# Patient Record
Sex: FEMALE | Race: WHITE | Employment: OTHER | URBAN - METROPOLITAN AREA
[De-identification: names, ages, dates, MRNs, and addresses within clinical notes are randomized per-mention and may not be internally consistent; named-entity substitution may affect disease eponyms.]

---

## 2017-08-23 ENCOUNTER — DOCTOR'S OFFICE (OUTPATIENT)
Dept: URBAN - METROPOLITAN AREA CLINIC 125 | Facility: CLINIC | Age: 57
Setting detail: OPHTHALMOLOGY
End: 2017-08-23
Payer: COMMERCIAL

## 2017-08-23 DIAGNOSIS — H25.13: ICD-10-CM

## 2017-08-23 DIAGNOSIS — H52.4: ICD-10-CM

## 2017-08-23 DIAGNOSIS — H43.813: ICD-10-CM

## 2017-08-23 PROCEDURE — 92012 INTRM OPH EXAM EST PATIENT: CPT | Performed by: OPTOMETRIST

## 2017-08-23 PROCEDURE — 92015 DETERMINE REFRACTIVE STATE: CPT | Performed by: OPTOMETRIST

## 2017-08-23 ASSESSMENT — REFRACTION_OUTSIDERX
OS_CYLINDER: -0.25
OD_AXIS: 170
OD_CYLINDER: -0.25
OS_AXIS: 150
OD_VA3: 20/
OS_VA3: 20/
OS_AXIS: 175
OD_VA3: 20/
OS_VA1: 20/20
OU_VA: 20/
OS_CYLINDER: -0.25
OD_ADD: +2.25
OD_AXIS: 030
OS_VA1: 20/
OD_CYLINDER: -0.25
OS_VA2: OU, 12-16''
OD_VA2: 20/20
OS_VA3: 20/
OS_SPHERE: +2.50
OU_VA: 20/
OD_VA2: 20/
OD_VA1: 20/25
OD_VA1: 20/
OD_SPHERE: +2.50
OD_SPHERE: +0.75
OS_ADD: +2.25
OS_SPHERE: +0.75
OS_VA2: 20/

## 2017-08-23 ASSESSMENT — REFRACTION_CURRENTRX
OS_CYLINDER: -0.25
OD_OVR_VA: 20/
OD_OVR_VA: 20/
OS_AXIS: 146
OS_OVR_VA: 20/
OD_VPRISM_DIRECTION: SV
OS_OVR_VA: 20/
OS_SPHERE: +2.50
OD_SPHERE: +2.50
OS_VPRISM_DIRECTION: SV
OS_OVR_VA: 20/
OD_AXIS: 028
OD_CYLINDER: -0.25
OD_OVR_VA: 20/

## 2017-08-23 ASSESSMENT — KERATOMETRY
OS_K2POWER_DIOPTERS: 45.75
OD_K1POWER_DIOPTERS: 44.75
OD_K2POWER_DIOPTERS: 45.75
OS_AXISANGLE_DEGREES: 173
OS_K1POWER_DIOPTERS: 44.50
OD_AXISANGLE_DEGREES: 010

## 2017-08-23 ASSESSMENT — REFRACTION_AUTOREFRACTION
OS_AXIS: 005
OS_SPHERE: +0.75
OD_AXIS: 169
OD_CYLINDER: -1.00
OS_CYLINDER: -0.50
OD_SPHERE: -1.00

## 2017-08-23 ASSESSMENT — VISUAL ACUITY
OD_BCVA: 20/40+1
OS_BCVA: 20/30

## 2017-08-23 ASSESSMENT — REFRACTION_MANIFEST
OD_VA3: 20/
OS_VA1: 20/
OU_VA: 20/
OS_VA3: 20/
OS_VA2: 20/
OD_VA1: 20/
OD_VA2: 20/

## 2017-08-23 ASSESSMENT — SPHEQUIV_DERIVED
OD_SPHEQUIV: -1.5
OS_SPHEQUIV: 0.5

## 2017-08-23 ASSESSMENT — AXIALLENGTH_DERIVED
OD_AL: 23.5337
OS_AL: 22.8259

## 2017-09-22 ENCOUNTER — OPTICAL OFFICE (OUTPATIENT)
Dept: URBAN - METROPOLITAN AREA CLINIC 134 | Facility: CLINIC | Age: 57
Setting detail: OPHTHALMOLOGY
End: 2017-09-22
Payer: COMMERCIAL

## 2017-09-22 DIAGNOSIS — H52.223: ICD-10-CM

## 2017-09-22 PROCEDURE — V2781 PROGRESSIVE LENS PER LENS: HCPCS | Performed by: OPTOMETRIST

## 2017-09-22 PROCEDURE — V2203 LENS SPHCYL BIFOCAL 4.00D/.1: HCPCS | Performed by: OPTOMETRIST

## 2017-09-22 PROCEDURE — V2020 VISION SVCS FRAMES PURCHASES: HCPCS | Performed by: OPTOMETRIST

## 2018-02-16 ENCOUNTER — OPTICAL OFFICE (OUTPATIENT)
Dept: URBAN - METROPOLITAN AREA CLINIC 134 | Facility: CLINIC | Age: 58
Setting detail: OPHTHALMOLOGY
End: 2018-02-16

## 2018-02-16 DIAGNOSIS — H52.7: ICD-10-CM

## 2018-02-16 PROCEDURE — V2020 VISION SVCS FRAMES PURCHASES: HCPCS | Performed by: OPTOMETRIST

## 2018-10-29 ENCOUNTER — APPOINTMENT (OUTPATIENT)
Dept: RADIOLOGY | Facility: CLINIC | Age: 58
End: 2018-10-29
Payer: COMMERCIAL

## 2018-10-29 ENCOUNTER — OFFICE VISIT (OUTPATIENT)
Dept: URGENT CARE | Facility: CLINIC | Age: 58
End: 2018-10-29
Payer: COMMERCIAL

## 2018-10-29 VITALS
RESPIRATION RATE: 20 BRPM | HEART RATE: 79 BPM | TEMPERATURE: 97.5 F | SYSTOLIC BLOOD PRESSURE: 164 MMHG | WEIGHT: 170 LBS | OXYGEN SATURATION: 99 % | BODY MASS INDEX: 30.12 KG/M2 | HEIGHT: 63 IN | DIASTOLIC BLOOD PRESSURE: 90 MMHG

## 2018-10-29 DIAGNOSIS — S99.922A INJURY OF LEFT FOOT, INITIAL ENCOUNTER: ICD-10-CM

## 2018-10-29 DIAGNOSIS — S99.922A INJURY OF LEFT FOOT, INITIAL ENCOUNTER: Primary | ICD-10-CM

## 2018-10-29 PROCEDURE — 73630 X-RAY EXAM OF FOOT: CPT

## 2018-10-29 PROCEDURE — 99283 EMERGENCY DEPT VISIT LOW MDM: CPT | Performed by: EMERGENCY MEDICINE

## 2018-10-29 PROCEDURE — G0382 LEV 3 HOSP TYPE B ED VISIT: HCPCS | Performed by: EMERGENCY MEDICINE

## 2018-10-29 RX ORDER — THIAMINE MONONITRATE (VIT B1) 100 MG
100 TABLET ORAL DAILY
COMMUNITY

## 2018-10-29 RX ORDER — LEVOTHYROXINE SODIUM 0.03 MG/1
100 TABLET ORAL DAILY
COMMUNITY

## 2018-10-29 RX ORDER — MULTIVITAMIN
1 CAPSULE ORAL DAILY
COMMUNITY

## 2018-10-29 RX ORDER — FOLIC ACID 1 MG/1
TABLET ORAL DAILY
COMMUNITY

## 2018-10-29 RX ORDER — SIMVASTATIN 20 MG
20 TABLET ORAL
COMMUNITY

## 2018-10-29 RX ORDER — CITALOPRAM 10 MG/1
10 TABLET ORAL DAILY
COMMUNITY

## 2018-10-29 NOTE — PROGRESS NOTES
[facility]        NAME: Mechelle Monroe is a 60 y o  female  : 1960    MRN: [id number]  DATE: 2018  TIME: 2:07 PM    Assessment and Plan   Injury of left foot, initial encounter [S99 922A]  1  Injury of left foot, initial encounter  XR foot 3+ vw left         Patient Instructions     There are no Patient Instructions on file for this visit  Follow up with PCP in 3-5 days  Proceed to  ER if symptoms worsen  Chief Complaint     Chief Complaint   Patient presents with    Foot Injury     Closet door fell on top of feet 2 weeks ago  Complaining of continuous pain since injury  History of Present Illness       Patient complains of pain dorsal feet bilaterally since a small door fell on them 2 weeks ago  The right foot seems to be healing without any problem however she continues to have pain on the left  She had prior ORIF surgery on her left foot and still has hardware in place  Review of Systems   Review of Systems   Constitutional: Negative for activity change  Gastrointestinal: Negative for abdominal pain  Musculoskeletal: Positive for gait problem  Negative for arthralgias, back pain, joint swelling, myalgias, neck pain and neck stiffness  Skin: Negative for color change and wound  Neurological: Negative for dizziness, syncope, weakness, light-headedness and headaches           Current Medications       Current Outpatient Prescriptions:     Cholecalciferol 2000 units TABS, Take 2,000 Units by mouth, Disp: , Rfl:     citalopram (CeleXA) 10 mg tablet, Take 10 mg by mouth daily, Disp: , Rfl:     cyanocobalamin 500 MCG tablet, Take 1,000 mcg by mouth daily, Disp: , Rfl:     folic acid (FOLVITE) 1 mg tablet, Take by mouth daily, Disp: , Rfl:     levothyroxine 25 mcg tablet, Take 100 mcg by mouth daily  , Disp: , Rfl:     magnesium oxide (MAG-OX) 400 mg, Take 400 mg by mouth 2 (two) times a day, Disp: , Rfl:     Multiple Vitamin (MULTIVITAMIN) capsule, Take 1 capsule by mouth daily, Disp: , Rfl:     simvastatin (ZOCOR) 20 mg tablet, Take 20 mg by mouth daily at bedtime, Disp: , Rfl:     Teriparatide, Recombinant, (FORTEO SC), Inject 20 mcg under the skin, Disp: , Rfl:     thiamine (VITAMIN B1) 100 mg tablet, Take 100 mg by mouth daily, Disp: , Rfl:     Current Allergies     Allergies as of 10/29/2018    (No Known Allergies)            The following portions of the patient's history were reviewed and updated as appropriate: allergies, current medications, past family history, past medical history, past social history, past surgical history and problem list      Past Medical History:   Diagnosis Date    Arthritis     Disease of thyroid gland     High cholesterol     Osteoporosis        Past Surgical History:   Procedure Laterality Date    ARTHROSCOPY KNEE      BREAST BIOPSY      CHOLECYSTECTOMY      ECTOPIC PREGNANCY SURGERY      EXPLORATORY LAPAROTOMY      FOOT SURGERY      HIP SURGERY      HYSTERECTOMY      SHOULDER ARTHROPLASTY      TOE SURGERY      TONSILLECTOMY         No family history on file  Medications have been verified  Objective   /90   Pulse 79   Temp 97 5 °F (36 4 °C) (Tympanic)   Resp 20   Ht 5' 3" (1 6 m)   Wt 77 1 kg (170 lb)   SpO2 99%   BMI 30 11 kg/m²        Physical Exam     Physical Exam   Constitutional: She is oriented to person, place, and time  She appears well-developed and well-nourished  HENT:   Head: Normocephalic and atraumatic  Eyes: Pupils are equal, round, and reactive to light  Conjunctivae and EOM are normal    Neck: Normal range of motion  Neck supple  Cardiovascular: Normal rate and regular rhythm  Pulmonary/Chest: Effort normal and breath sounds normal    Musculoskeletal: She exhibits tenderness  Tender left foot dorsum no swelling  Neurological: She is alert and oriented to person, place, and time  Skin: Skin is warm and dry  No rash noted     Nursing note and vitals reviewed

## 2018-10-29 NOTE — PATIENT INSTRUCTIONS
Foot Contusion   WHAT YOU NEED TO KNOW:   A foot contusion is a bruise to the foot  DISCHARGE INSTRUCTIONS:   Medicines:   · NSAIDs:  These medicines decrease swelling and pain  NSAIDs are available without a doctor's order  Ask your healthcare provider which medicine is right for you  Ask how much to take and when to take it  Take as directed  NSAIDs can cause stomach bleeding and kidney problems if not taken correctly  · Take your medicine as directed  Contact your healthcare provider if you think your medicine is not helping or if you have side effects  Tell him of her if you are allergic to any medicine  Keep a list of the medicines, vitamins, and herbs you take  Include the amounts, and when and why you take them  Bring the list or the pill bottles to follow-up visits  Carry your medicine list with you in case of an emergency  Follow up with your healthcare provider as directed:  Write down your questions so you remember to ask them during your visits  Care for your foot: Follow your treatment plan to help decrease your pain and improve your muscle movement  · Rest:  You will need to rest your foot for 1 to 2 days after your injury  This will help decrease the risk of more damage  · Ice:  Ice helps decrease swelling and pain  Ice may also help prevent tissue damage  Use an ice pack, or put crushed ice in a plastic bag  Cover it with a towel and place it on your foot for 15 to 20 minutes every hour or as directed  · Compression:  Compression (tight hold) provides support and helps decrease swelling and movement so your foot can heal  You may be told to keep your foot wrapped with a tight elastic bandage  Follow instructions about how to apply your bandage  Do not massage your foot  You could cause more damage or pain  · Elevation:  Keep your foot raised above the level of your heart while you are sitting or lying down  This will help decrease or limit swelling   Use pillows, blankets, or rolled towels to elevate your foot comfortably  Exercise your foot:  You may be given gentle exercises to improve your foot movement and help decrease stiffness  Ask when you can return to your normal activities or sports  Prevent another injury:   · Wear equipment to protect yourself when you play sports  · Make sure your shoes fit properly  · Always wear shoes on streets or sidewalks  · Clean spills off the floor right away to avoid slipping or hitting your foot  · Make sure your home is well lit when you get up during the night  This will help you avoid hurting your foot in the dark  Contact your healthcare provider if:   · You have increased swelling on your foot  · You have severe foot pain  · You are not able to move your foot  · You have questions or concerns about your injury or treatment  © 2017 [employer] Information is for End User's use only and may not be sold, redistributed or otherwise used for commercial purposes  All illustrations and images included in CareNotes® are the copyrighted property of [employer]  or [employer]  The above information is an  only  It is not intended as medical advice for individual conditions or treatments  Talk to your doctor, nurse or pharmacist before following any medical regimen to see if it is safe and effective for you

## 2019-02-18 ENCOUNTER — DOCTOR'S OFFICE (OUTPATIENT)
Dept: URBAN - METROPOLITAN AREA CLINIC 125 | Facility: CLINIC | Age: 59
Setting detail: OPHTHALMOLOGY
End: 2019-02-18
Payer: COMMERCIAL

## 2019-02-18 DIAGNOSIS — H52.4: ICD-10-CM

## 2019-02-18 PROCEDURE — 92015 DETERMINE REFRACTIVE STATE: CPT | Performed by: OPTOMETRIST

## 2019-02-18 PROCEDURE — 92014 COMPRE OPH EXAM EST PT 1/>: CPT | Performed by: OPTOMETRIST

## 2019-02-18 ASSESSMENT — REFRACTION_MANIFEST
OD_SPHERE: +0.50
OS_VA3: 20/
OS_AXIS: 175
OD_VA2: 20/
OS_VA1: 20/
OS_VA3: 20/
OD_VA3: 20/
OD_ADD: +2.25
OD_VA1: 20/25+
OD_VA1: 20/
OS_VA2: 20/
OS_ADD: +2.25
OU_VA: 20/
OD_AXIS: 170
OS_VA2: OU, 12-16''
OD_VA3: 20/
OS_SPHERE: +0.50
OD_VA2: 20/20
OS_VA1: 20/25+
OU_VA: 20/20
OD_CYLINDER: -0.25
OS_CYLINDER: -0.25

## 2019-02-18 ASSESSMENT — KERATOMETRY
OD_K1POWER_DIOPTERS: 45.00
OS_AXISANGLE_DEGREES: 168
OS_K1POWER_DIOPTERS: 44.75
OD_K2POWER_DIOPTERS: 46.50
OS_K2POWER_DIOPTERS: 46.00
OD_AXISANGLE_DEGREES: 013

## 2019-02-18 ASSESSMENT — REFRACTION_CURRENTRX
OS_VPRISM_DIRECTION: SV
OS_OVR_VA: 20/
OD_VPRISM_DIRECTION: SV
OS_SPHERE: +2.50
OS_OVR_VA: 20/
OD_OVR_VA: 20/
OD_OVR_VA: 20/
OS_CYLINDER: -0.25
OS_OVR_VA: 20/
OD_OVR_VA: 20/
OS_AXIS: 150
OD_CYLINDER: -0.25
OD_AXIS: 022
OD_SPHERE: +2.50

## 2019-02-18 ASSESSMENT — AXIALLENGTH_DERIVED
OD_AL: 22.6579
OD_AL: 22.5684
OS_AL: 22.7856
OS_AL: 22.6054

## 2019-02-18 ASSESSMENT — SPHEQUIV_DERIVED
OD_SPHEQUIV: 0.375
OS_SPHEQUIV: 0.375
OD_SPHEQUIV: 0.625
OS_SPHEQUIV: 0.875

## 2019-02-18 ASSESSMENT — CONFRONTATIONAL VISUAL FIELD TEST (CVF)
OS_FINDINGS: FULL
OD_FINDINGS: FULL

## 2019-02-18 ASSESSMENT — VISUAL ACUITY
OD_BCVA: 20/40
OS_BCVA: 20/30+2

## 2019-02-18 ASSESSMENT — REFRACTION_AUTOREFRACTION
OD_CYLINDER: -0.75
OS_AXIS: 137
OD_AXIS: 154
OD_SPHERE: +1.00
OS_SPHERE: +1.00
OS_CYLINDER: -0.25

## 2019-04-02 ENCOUNTER — OPTICAL OFFICE (OUTPATIENT)
Dept: URBAN - METROPOLITAN AREA CLINIC 134 | Facility: CLINIC | Age: 59
Setting detail: OPHTHALMOLOGY
End: 2019-04-02
Payer: COMMERCIAL

## 2019-04-02 DIAGNOSIS — H52.223: ICD-10-CM

## 2019-04-02 PROCEDURE — V2781 PROGRESSIVE LENS PER LENS: HCPCS | Performed by: OPTOMETRIST

## 2019-04-02 PROCEDURE — V2203 LENS SPHCYL BIFOCAL 4.00D/.1: HCPCS | Performed by: OPTOMETRIST

## 2019-04-02 PROCEDURE — V2020 VISION SVCS FRAMES PURCHASES: HCPCS | Performed by: OPTOMETRIST

## 2020-03-02 ENCOUNTER — DOCTOR'S OFFICE (OUTPATIENT)
Dept: URBAN - METROPOLITAN AREA CLINIC 125 | Facility: CLINIC | Age: 60
Setting detail: OPHTHALMOLOGY
End: 2020-03-02
Payer: COMMERCIAL

## 2020-03-02 DIAGNOSIS — H52.4: ICD-10-CM

## 2020-03-02 PROCEDURE — 92015 DETERMINE REFRACTIVE STATE: CPT | Performed by: OPHTHALMOLOGY

## 2020-03-02 PROCEDURE — 92014 COMPRE OPH EXAM EST PT 1/>: CPT | Performed by: OPHTHALMOLOGY

## 2020-03-02 ASSESSMENT — REFRACTION_CURRENTRX
OD_ADD: +2.25
OS_VPRISM_DIRECTION: SV
OS_ADD: +2.25
OD_VPRISM_DIRECTION: SV
OS_SPHERE: +0.50
OD_SPHERE: +0.50
OS_AXIS: 163
OS_OVR_VA: 20/
OS_CYLINDER: -0.25
OD_OVR_VA: 20/
OD_AXIS: 158
OD_CYLINDER: -0.25

## 2020-03-02 ASSESSMENT — REFRACTION_AUTOREFRACTION
OD_AXIS: 163
OS_CYLINDER: -0.25
OD_SPHERE: +1.25
OS_AXIS: 133
OD_CYLINDER: -0.50
OS_SPHERE: +1.00

## 2020-03-02 ASSESSMENT — AXIALLENGTH_DERIVED
OD_AL: 22.5607
OS_AL: 22.6476
OS_AL: 22.8285
OD_AL: 22.7856

## 2020-03-02 ASSESSMENT — KERATOMETRY
OS_AXISANGLE_DEGREES: 160
OS_K1POWER_DIOPTERS: 44.75
OD_AXISANGLE_DEGREES: 016
OS_K2POWER_DIOPTERS: 45.75
OD_K2POWER_DIOPTERS: 46.00
OD_K1POWER_DIOPTERS: 44.75

## 2020-03-02 ASSESSMENT — REFRACTION_MANIFEST
OD_AXIS: 170
OD_VA2: 20/20
OU_VA: 20/20
OS_SPHERE: +0.50
OD_CYLINDER: -0.25
OD_ADD: +2.25
OS_AXIS: 175
OD_VA1: 20/25+
OS_VA1: 20/25+
OS_VA2: OU, 12-16''
OS_ADD: +2.25
OS_CYLINDER: -0.25
OD_SPHERE: +0.50

## 2020-03-02 ASSESSMENT — VISUAL ACUITY
OD_BCVA: 20/20-1
OS_BCVA: 20/20

## 2020-03-02 ASSESSMENT — SPHEQUIV_DERIVED
OD_SPHEQUIV: 1
OS_SPHEQUIV: 0.375
OD_SPHEQUIV: 0.375
OS_SPHEQUIV: 0.875

## 2020-03-02 ASSESSMENT — CONFRONTATIONAL VISUAL FIELD TEST (CVF)
OS_FINDINGS: FULL
OD_FINDINGS: FULL

## 2021-04-05 ENCOUNTER — OPTICAL OFFICE (OUTPATIENT)
Dept: URBAN - METROPOLITAN AREA CLINIC 134 | Facility: CLINIC | Age: 61
Setting detail: OPHTHALMOLOGY
End: 2021-04-05
Payer: COMMERCIAL

## 2021-04-05 ENCOUNTER — DOCTOR'S OFFICE (OUTPATIENT)
Dept: URBAN - METROPOLITAN AREA CLINIC 125 | Facility: CLINIC | Age: 61
Setting detail: OPHTHALMOLOGY
End: 2021-04-05
Payer: COMMERCIAL

## 2021-04-05 DIAGNOSIS — H52.223: ICD-10-CM

## 2021-04-05 DIAGNOSIS — H52.4: ICD-10-CM

## 2021-04-05 PROBLEM — H43.813 POSTERIOR VITREOUS DETACHMENT; BOTH EYES: Status: ACTIVE | Noted: 2017-08-23

## 2021-04-05 PROBLEM — H25.13: Status: ACTIVE | Noted: 2017-08-23

## 2021-04-05 PROBLEM — H35.371 MACULAR PUCKER; RIGHT EYE: Status: ACTIVE | Noted: 2021-04-05

## 2021-04-05 PROCEDURE — V2781 PROGRESSIVE LENS PER LENS: HCPCS | Performed by: OPTOMETRIST

## 2021-04-05 PROCEDURE — V2020 VISION SVCS FRAMES PURCHASES: HCPCS | Performed by: OPTOMETRIST

## 2021-04-05 PROCEDURE — 92014 COMPRE OPH EXAM EST PT 1/>: CPT | Performed by: OPTOMETRIST

## 2021-04-05 PROCEDURE — 92015 DETERMINE REFRACTIVE STATE: CPT | Performed by: OPTOMETRIST

## 2021-04-05 PROCEDURE — V2203 LENS SPHCYL BIFOCAL 4.00D/.1: HCPCS | Performed by: OPTOMETRIST

## 2021-04-05 ASSESSMENT — REFRACTION_AUTOREFRACTION
OS_AXIS: 143
OS_SPHERE: +0.75
OD_CYLINDER: -0.75
OS_CYLINDER: -0.50
OD_AXIS: 097
OD_SPHERE: +1.75

## 2021-04-05 ASSESSMENT — REFRACTION_CURRENTRX
OS_CYLINDER: -0.25
OD_ADD: +2.25
OS_OVR_VA: 20/
OD_OVR_VA: 20/
OD_AXIS: 158
OS_SPHERE: +0.50
OD_SPHERE: +0.50
OD_CYLINDER: -0.25
OS_AXIS: 163
OD_VPRISM_DIRECTION: SV
OS_VPRISM_DIRECTION: SV
OS_ADD: +2.25

## 2021-04-05 ASSESSMENT — REFRACTION_MANIFEST
OD_VA1: 20/25-2
OD_SPHERE: +1.00
OD_AXIS: 105
OS_CYLINDER: -0.50
OD_CYLINDER: -0.25
OS_ADD: +2.50
OS_VA1: 20/20-
OS_SPHERE: +0.75
OD_ADD: +2.50
OS_AXIS: 145

## 2021-04-05 ASSESSMENT — KERATOMETRY
OS_K1POWER_DIOPTERS: 44.50
OD_K1POWER_DIOPTERS: 44.00
OD_AXISANGLE_DEGREES: 035
OS_AXISANGLE_DEGREES: 177
OD_K2POWER_DIOPTERS: 47.00
OS_K2POWER_DIOPTERS: 45.75

## 2021-04-05 ASSESSMENT — VISUAL ACUITY
OS_BCVA: 20/30+1
OD_BCVA: 20/20-2

## 2021-04-05 ASSESSMENT — AXIALLENGTH_DERIVED
OD_AL: 22.5633
OS_AL: 22.8259
OD_AL: 22.3865
OS_AL: 22.8259

## 2021-04-05 ASSESSMENT — SPHEQUIV_DERIVED
OD_SPHEQUIV: 1.375
OD_SPHEQUIV: 0.875
OS_SPHEQUIV: 0.5
OS_SPHEQUIV: 0.5

## 2021-04-05 ASSESSMENT — MACULA - EPIRETINAL MEMBRANE (ERM): OD_ERM: 1+
